# Patient Record
Sex: MALE | Race: WHITE | NOT HISPANIC OR LATINO | URBAN - METROPOLITAN AREA
[De-identification: names, ages, dates, MRNs, and addresses within clinical notes are randomized per-mention and may not be internally consistent; named-entity substitution may affect disease eponyms.]

---

## 2018-05-11 ENCOUNTER — OFFICE VISIT - HEALTHEAST (OUTPATIENT)
Dept: FAMILY MEDICINE | Facility: CLINIC | Age: 38
End: 2018-05-11

## 2018-05-11 ENCOUNTER — COMMUNICATION - HEALTHEAST (OUTPATIENT)
Dept: TELEHEALTH | Facility: CLINIC | Age: 38
End: 2018-05-11

## 2018-05-11 DIAGNOSIS — Z00.00 ROUTINE GENERAL MEDICAL EXAMINATION AT A HEALTH CARE FACILITY: ICD-10-CM

## 2018-05-11 DIAGNOSIS — F41.0 PANIC ATTACKS: ICD-10-CM

## 2018-05-11 LAB
AMPHETAMINES UR QL SCN: NORMAL
BARBITURATES UR QL: NORMAL
BENZODIAZ UR QL: NORMAL
CALCIUM SERPL-MCNC: 10.1 MG/DL (ref 8.5–10.5)
CANNABINOIDS UR QL SCN: NORMAL
CHOLEST SERPL-MCNC: 239 MG/DL
COCAINE UR QL: NORMAL
CREAT UR-MCNC: 86.8 MG/DL
FASTING STATUS PATIENT QL REPORTED: YES
FASTING STATUS PATIENT QL REPORTED: YES
GLUCOSE BLD-MCNC: 94 MG/DL (ref 70–99)
HDLC SERPL-MCNC: 85 MG/DL
LDLC SERPL CALC-MCNC: 140 MG/DL
METHADONE UR QL SCN: NORMAL
OPIATES UR QL SCN: NORMAL
OXYCODONE UR QL: NORMAL
PCP UR QL SCN: NORMAL
TRIGL SERPL-MCNC: 70 MG/DL

## 2018-05-11 ASSESSMENT — MIFFLIN-ST. JEOR: SCORE: 1775.71

## 2018-05-11 NOTE — ASSESSMENT & PLAN NOTE
Not under adequate control.  Continue citalopram.  Add Wellbutrin  mg daily.  We discussed the addictive potential of lorazepam and need to limit this medication.  Will refill 30 tabs once monthly x 2 and then will plan to wean.  Control substance agreement signed. Urine tox screen done. Patient has been difficult to manage despite tryingseveral different medications.  I have recommended evaluation by psychiatry and psychology.  Patient is agreeable.

## 2018-05-30 ENCOUNTER — COMMUNICATION - HEALTHEAST (OUTPATIENT)
Dept: SCHEDULING | Facility: CLINIC | Age: 38
End: 2018-05-30

## 2018-05-30 ENCOUNTER — COMMUNICATION - HEALTHEAST (OUTPATIENT)
Dept: FAMILY MEDICINE | Facility: CLINIC | Age: 38
End: 2018-05-30

## 2018-05-30 ENCOUNTER — OFFICE VISIT - HEALTHEAST (OUTPATIENT)
Dept: FAMILY MEDICINE | Facility: CLINIC | Age: 38
End: 2018-05-30

## 2018-05-30 DIAGNOSIS — R50.9 FEVER: ICD-10-CM

## 2018-05-30 DIAGNOSIS — R05.9 COUGH: ICD-10-CM

## 2018-05-30 DIAGNOSIS — J18.9 LEFT LOWER LOBE PNEUMONIA: ICD-10-CM

## 2018-05-30 DIAGNOSIS — R07.89 CHEST WALL PAIN: ICD-10-CM

## 2018-05-30 DIAGNOSIS — R10.9 LEFT FLANK PAIN: ICD-10-CM

## 2018-05-30 LAB
ALBUMIN UR-MCNC: ABNORMAL MG/DL
APPEARANCE UR: CLEAR
BACTERIA #/AREA URNS HPF: ABNORMAL HPF
BASOPHILS # BLD AUTO: 0.1 THOU/UL (ref 0–0.2)
BASOPHILS NFR BLD AUTO: 1 % (ref 0–2)
BILIRUB UR QL STRIP: NEGATIVE
COLOR UR AUTO: YELLOW
EOSINOPHIL # BLD AUTO: 0.1 THOU/UL (ref 0–0.4)
EOSINOPHIL NFR BLD AUTO: 1 % (ref 0–6)
ERYTHROCYTE [DISTWIDTH] IN BLOOD BY AUTOMATED COUNT: 10.7 % (ref 11–14.5)
FLUAV AG SPEC QL IA: NORMAL
FLUBV AG SPEC QL IA: NORMAL
GLUCOSE UR STRIP-MCNC: NEGATIVE MG/DL
HCT VFR BLD AUTO: 45.3 % (ref 40–54)
HGB BLD-MCNC: 15.3 G/DL (ref 14–18)
HGB UR QL STRIP: NEGATIVE
KETONES UR STRIP-MCNC: ABNORMAL MG/DL
LEUKOCYTE ESTERASE UR QL STRIP: NEGATIVE
LYMPHOCYTES # BLD AUTO: 1.1 THOU/UL (ref 0.8–4.4)
LYMPHOCYTES NFR BLD AUTO: 10 % (ref 20–40)
MCH RBC QN AUTO: 32.3 PG (ref 27–34)
MCHC RBC AUTO-ENTMCNC: 33.8 G/DL (ref 32–36)
MCV RBC AUTO: 96 FL (ref 80–100)
MONOCYTES # BLD AUTO: 1.2 THOU/UL (ref 0–0.9)
MONOCYTES NFR BLD AUTO: 11 % (ref 2–10)
NEUTROPHILS # BLD AUTO: 8.8 THOU/UL (ref 2–7.7)
NEUTROPHILS NFR BLD AUTO: 79 % (ref 50–70)
NITRATE UR QL: NEGATIVE
PH UR STRIP: >=9 [PH] (ref 5–8)
PLATELET # BLD AUTO: 281 THOU/UL (ref 140–440)
PMV BLD AUTO: 6.4 FL (ref 7–10)
RBC # BLD AUTO: 4.73 MILL/UL (ref 4.4–6.2)
RBC #/AREA URNS AUTO: ABNORMAL HPF
SP GR UR STRIP: 1.01 (ref 1–1.03)
SQUAMOUS #/AREA URNS AUTO: ABNORMAL LPF
UROBILINOGEN UR STRIP-ACNC: ABNORMAL
WBC #/AREA URNS AUTO: ABNORMAL HPF
WBC: 11.2 THOU/UL (ref 4–11)

## 2018-05-30 RX ORDER — ALBUTEROL SULFATE 90 UG/1
2 AEROSOL, METERED RESPIRATORY (INHALATION) EVERY 4 HOURS PRN
Qty: 1 EACH | Refills: 0 | Status: SHIPPED | OUTPATIENT
Start: 2018-05-30

## 2018-07-26 ENCOUNTER — COMMUNICATION - HEALTHEAST (OUTPATIENT)
Dept: FAMILY MEDICINE | Facility: CLINIC | Age: 38
End: 2018-07-26

## 2018-07-26 DIAGNOSIS — F41.0 PANIC ATTACKS: ICD-10-CM

## 2018-12-28 ENCOUNTER — COMMUNICATION - HEALTHEAST (OUTPATIENT)
Dept: FAMILY MEDICINE | Facility: CLINIC | Age: 38
End: 2018-12-28

## 2018-12-28 DIAGNOSIS — F41.0 PANIC ATTACKS: ICD-10-CM

## 2019-01-03 ENCOUNTER — OFFICE VISIT - HEALTHEAST (OUTPATIENT)
Dept: FAMILY MEDICINE | Facility: CLINIC | Age: 39
End: 2019-01-03

## 2019-01-03 ENCOUNTER — COMMUNICATION - HEALTHEAST (OUTPATIENT)
Dept: FAMILY MEDICINE | Facility: CLINIC | Age: 39
End: 2019-01-03

## 2019-01-03 DIAGNOSIS — S61.412A LACERATION OF LEFT HAND, FOREIGN BODY PRESENCE UNSPECIFIED, INITIAL ENCOUNTER: ICD-10-CM

## 2019-10-10 ENCOUNTER — COMMUNICATION - HEALTHEAST (OUTPATIENT)
Dept: FAMILY MEDICINE | Facility: CLINIC | Age: 39
End: 2019-10-10

## 2019-10-10 ENCOUNTER — OFFICE VISIT - HEALTHEAST (OUTPATIENT)
Dept: FAMILY MEDICINE | Facility: CLINIC | Age: 39
End: 2019-10-10

## 2019-10-10 DIAGNOSIS — F41.0 PANIC ATTACKS: ICD-10-CM

## 2019-10-10 DIAGNOSIS — Z13.1 SCREENING FOR DIABETES MELLITUS: ICD-10-CM

## 2019-10-10 DIAGNOSIS — Z13.220 SCREENING FOR LIPID DISORDERS: ICD-10-CM

## 2019-10-10 DIAGNOSIS — Z72.0 TOBACCO ABUSE: ICD-10-CM

## 2019-10-10 DIAGNOSIS — E78.5 HYPERLIPIDEMIA, UNSPECIFIED HYPERLIPIDEMIA TYPE: ICD-10-CM

## 2019-10-10 DIAGNOSIS — F41.9 ANXIETY: ICD-10-CM

## 2019-10-10 DIAGNOSIS — L20.82 FLEXURAL ECZEMA: ICD-10-CM

## 2019-10-10 DIAGNOSIS — Z00.00 ROUTINE GENERAL MEDICAL EXAMINATION AT A HEALTH CARE FACILITY: ICD-10-CM

## 2019-10-10 LAB
ALT SERPL W P-5'-P-CCNC: 58 U/L (ref 0–45)
ANION GAP SERPL CALCULATED.3IONS-SCNC: 11 MMOL/L (ref 5–18)
AST SERPL W P-5'-P-CCNC: 43 U/L (ref 0–40)
BUN SERPL-MCNC: 12 MG/DL (ref 8–22)
CALCIUM SERPL-MCNC: 10.5 MG/DL (ref 8.5–10.5)
CHLORIDE BLD-SCNC: 101 MMOL/L (ref 98–107)
CHOLEST SERPL-MCNC: 229 MG/DL
CO2 SERPL-SCNC: 27 MMOL/L (ref 22–31)
CREAT SERPL-MCNC: 0.93 MG/DL (ref 0.7–1.3)
FASTING STATUS PATIENT QL REPORTED: YES
GFR SERPL CREATININE-BSD FRML MDRD: >60 ML/MIN/1.73M2
GLUCOSE BLD-MCNC: 95 MG/DL (ref 70–125)
HDLC SERPL-MCNC: 101 MG/DL
LDLC SERPL CALC-MCNC: 106 MG/DL
POTASSIUM BLD-SCNC: 4.8 MMOL/L (ref 3.5–5)
SODIUM SERPL-SCNC: 139 MMOL/L (ref 136–145)
TRIGL SERPL-MCNC: 110 MG/DL

## 2019-10-10 RX ORDER — LORAZEPAM 0.5 MG/1
TABLET ORAL
Qty: 10 TABLET | Refills: 0 | Status: SHIPPED | OUTPATIENT
Start: 2019-10-10

## 2019-10-10 RX ORDER — DIPHENHYDRAMINE HCL 25 MG
25 CAPSULE ORAL
Status: SHIPPED | COMMUNITY
Start: 2019-10-10

## 2019-10-10 RX ORDER — CLOBETASOL PROPIONATE 0.5 MG/G
CREAM TOPICAL
Qty: 45 G | Refills: 0 | Status: SHIPPED | OUTPATIENT
Start: 2019-10-10

## 2019-10-10 ASSESSMENT — PATIENT HEALTH QUESTIONNAIRE - PHQ9: SUM OF ALL RESPONSES TO PHQ QUESTIONS 1-9: 8

## 2019-10-10 ASSESSMENT — ANXIETY QUESTIONNAIRES
2. NOT BEING ABLE TO STOP OR CONTROL WORRYING: NEARLY EVERY DAY
7. FEELING AFRAID AS IF SOMETHING AWFUL MIGHT HAPPEN: SEVERAL DAYS
GAD7 TOTAL SCORE: 17
6. BECOMING EASILY ANNOYED OR IRRITABLE: MORE THAN HALF THE DAYS
4. TROUBLE RELAXING: NEARLY EVERY DAY
1. FEELING NERVOUS, ANXIOUS, OR ON EDGE: NEARLY EVERY DAY
3. WORRYING TOO MUCH ABOUT DIFFERENT THINGS: NEARLY EVERY DAY
5. BEING SO RESTLESS THAT IT IS HARD TO SIT STILL: MORE THAN HALF THE DAYS

## 2019-10-10 ASSESSMENT — MIFFLIN-ST. JEOR: SCORE: 1721.85

## 2019-10-10 NOTE — ASSESSMENT & PLAN NOTE
Symptoms are intermittent.  Currently, they are quite severe on his left elbow.  Time scaly.  Treatwith high potency steroid.  Clobetasol prescribed.  He understands that he cannot use this medication more than 14 days at a time.

## 2019-10-10 NOTE — ASSESSMENT & PLAN NOTE
Previous history of anxiety with episodes of panic.  In reviewing his history, I suspect that he actually has bipolar disorder or cyclothymia.  He denies suicidal ideation.  He has had side effect such as sexual side effects and sedation with SSRI therapy in the past.  Unclear if he has been on an SNRI.  - Monotherapy with Abilify.  Initial dose will be 2.5 mg/day.  We discussed side effects of this medication.  If ineffective at this dose, we will increase to 5 mg.  - Referral to psychiatry/psychology.   -Check metabolic labs.

## 2019-10-10 NOTE — ASSESSMENT & PLAN NOTE
Patient was tolerant of 2.5 mg, 5 mg.  At this point, not much clinical benefit.  We will increase to 7.5 mg and reassess in a coupleof weeks.  Revised prescription sent to pharmacy.  Will plan for clinic visit in 2-3 weeks.

## 2019-10-30 ENCOUNTER — COMMUNICATION - HEALTHEAST (OUTPATIENT)
Dept: FAMILY MEDICINE | Facility: CLINIC | Age: 39
End: 2019-10-30

## 2019-11-04 RX ORDER — ARIPIPRAZOLE 5 MG/1
7.5 TABLET ORAL DAILY
Qty: 45 TABLET | Refills: 1 | Status: SHIPPED | OUTPATIENT
Start: 2019-11-04

## 2021-05-26 ASSESSMENT — PATIENT HEALTH QUESTIONNAIRE - PHQ9: SUM OF ALL RESPONSES TO PHQ QUESTIONS 1-9: 8

## 2021-05-28 ASSESSMENT — ANXIETY QUESTIONNAIRES: GAD7 TOTAL SCORE: 17

## 2021-06-01 VITALS — WEIGHT: 191 LBS | HEIGHT: 69 IN | BODY MASS INDEX: 28.29 KG/M2

## 2021-06-01 VITALS — BODY MASS INDEX: 27.86 KG/M2 | WEIGHT: 190 LBS

## 2021-06-02 VITALS — WEIGHT: 191.8 LBS | BODY MASS INDEX: 28.12 KG/M2

## 2021-06-02 NOTE — TELEPHONE ENCOUNTER
Per [facility] no appointemnts have been made with them. I did mail a letter to him as well, but have had no response.

## 2021-06-03 VITALS
HEIGHT: 69 IN | HEART RATE: 66 BPM | OXYGEN SATURATION: 98 % | DIASTOLIC BLOOD PRESSURE: 74 MMHG | TEMPERATURE: 98.1 F | BODY MASS INDEX: 26.66 KG/M2 | WEIGHT: 180 LBS | RESPIRATION RATE: 18 BRPM | SYSTOLIC BLOOD PRESSURE: 118 MMHG

## 2021-06-03 NOTE — TELEPHONE ENCOUNTER
Anxiety  Patient was tolerant of 2.5 mg, 5 mg.  At this point, not much clinical benefit.  We will increase to 7.5 mg and reassess in a couple of weeks.  Revised prescription sent to pharmacy.  Will plan for clinic visit in 2-3 weeks.

## 2021-06-16 PROBLEM — Z00.00 ROUTINE GENERAL MEDICAL EXAMINATION AT A HEALTH CARE FACILITY: Status: ACTIVE | Noted: 2019-10-10

## 2021-06-16 PROBLEM — F41.9 ANXIETY: Status: ACTIVE | Noted: 2018-05-11

## 2021-06-16 PROBLEM — L20.82 FLEXURAL ECZEMA: Status: ACTIVE | Noted: 2019-10-10

## 2021-06-16 PROBLEM — E78.5 HYPERLIPIDEMIA: Status: ACTIVE | Noted: 2018-05-22

## 2021-06-16 PROBLEM — Z72.0 TOBACCO ABUSE: Status: ACTIVE | Noted: 2019-10-10

## 2021-06-17 NOTE — PATIENT INSTRUCTIONS - HE
Patient Instructions by Darrell Brewer PA-C at 1/3/2019  8:50 AM     Author: Darrell Brewer PA-C Service: -- Author Type: Physician Assistant    Filed: 1/3/2019 10:17 AM Encounter Date: 1/3/2019 Status: Signed    : Darrell Brewer PA-C (Physician Assistant)       Your wound was closed with sutures and covered with antibiotic ointment and a non-adhesive dressing.    General wound maintenance:  - Dressings should be left in place for 24 hours, afterwards wound may be left open to air  - May continue to cover injury site with a topical antibiotic such as neosporin  - A simple band-aid will work to protect small wounds and keep topical antibiotic on  - Cloth gauze are more useful for covering larger wounds with tape to secure  - May shower normally if it has been longer than 12 hours since sutures were placed    Avoid any swimming until after sutures have been removed.    Avoid a lot of stretching or pulling at the interdigital area of the laceration.  Protect hand from trauma and keep clean dry and covered until suture removed.  Work restrictions with no use of the left hand until seen for suture removal in 10-12 days.    Follow-up in 10-12 days to remove your sutures.    Come back sooner if you develop fever or your wound site becomes increasingly red, tender, producing pus or other drainage, or reopens.      Patient Education     Extremity Laceration: Stitches, Staples, or Tape  A laceration is a cut through the skin. If it is deep, it may require stitches or staples to close so it can heal. Minor cuts may be treated with surgical tape closures, or skin glue.  X-rays may be done if something may have entered the skin through the cut. You may also need a tetanus shot if you are not up to date on this vaccine.  Home care    Follow the healthcare providers instructions on how to care for the cut.    Wash your hands with soap and warm water before and after caring for your wound. This is to help prevent  infection.    Keep the wound clean and dry. If a bandage was applied and it becomes wet or dirty, replace it. Otherwise, leave it in place for the first 24 hours, then change it once a day or as directed.    If stitches or staples were used, clean the wound daily:  ? After removing the bandage, wash the area with soap and water. Use a wet cotton swab to loosen and remove any blood or crust that forms.  ? After cleaning, keep the wound clean and dry. Talk with your healthcare provider before putting any antibiotic ointment on the wound. Reapply the bandage.    You may remove the bandage to shower as usual after the first 24 hours, but don't soak the area in water (no swimming) until the stitches or staples are removed.    If surgical tape closures were used, keep the area clean and dry. If it becomes wet, blot it dry with a towel. Let the surgical tape fall off on its own.    The healthcare provider may prescribe an antibiotic cream or ointment to prevent infection. He or she may also prescribe an antibiotic pill. Don't stop taking this medicine until you have finished it all or the provider tells you to stop.    The provider may also prescribe medicine for pain. Follow the instructions for taking these medicines.    Don't do activities that may reopen your wound.  Follow-up care  Follow up with your healthcare provider, or as advised. Most skin wounds heal within 10 days. But an infection may sometimes occur even with proper treatment. Check the wound daily for the signs of infection listed below. Stitches and staples should be removed within 7 to14 days. If surgical tape closures were used, you may remove them after 10 days if they have not fallen off by then.   When to seek medical advice  Call your healthcare provider right away if any of these occur:    Wound bleeding not controlled by direct pressure    Signs of infection, including increasing pain in the wound, increasing wound redness or swelling, or pus or  bad odor coming from the wound    Fever of 100.4 F (38 C) or higher, or as directed by your healthcare provider    Stitches or staples come apart or fall out or surgical tape falls off before 7 days    Wound edges reopen    Wound changes colors    Numbness occurs around the wound     Decreased movement around the injured area  Date Last Reviewed: 7/1/2017 2000-2017 [employer]. [address] [place] [address]. All rights reserved. This information is not intended as a substitute for professional medical care. Always follow your healthcare professional's instructions.

## 2021-06-17 NOTE — PROGRESS NOTES
MALE PREVENTATIVE EXAM    Assessment and Plan:     Problem List Items Addressed This Visit     Panic attacks     Not under adequate control.  Continue citalopram.  Add Wellbutrin  mg daily.  We discussed the addictive potential of lorazepam and need to limit this medication.  Will refill 30 tabs once monthly x 2 and then will plan to wean.  Control substance agreement signed. Urine tox screen done. Patient has been difficult to manage despite trying several different medications.  I have recommended evaluation by psychiatry and psychology.  Patient is agreeable.         Relevant Medications    LORazepam (ATIVAN) 0.5 MG tablet    buPROPion (WELLBUTRIN XL) 150 MG 24 hr tablet    Other Relevant Orders    Calcium (Completed)    Drug Abuse 1+, Urine (Completed)    Ambulatory referral to Psychiatry    Ambulatory referral to Psychology      Other Visit Diagnoses     Routine general medical examination at a health care facility    -  Primary    Relevant Orders    Lipid [place] FASTING (Completed)    Glucose (Completed)          Patient Instructions   Start Wellbutrin 150 mg daily.  Continue Lexapro 5 mg daily for 1 week.  Then decrease to 2.5 mg daily for 3 weeks, then stop.  You will get calls to schedule with psychiatry and psychology.  Use Lorazepam sparingly.  May refill 30 tabs once monthly for two months, then we will be decreasing.  We will notify you of lab results.  Consider signing up for my chart.     Subjective:   Chief Complaint: Paco Hernandes is an 38 y.o. male here for a preventative health visit.     HPI: The patient is also complaining of anxiety.  He reports he had been having issues with anxiety about 10 years ago.  He started having panic attacks again about a year ago.  He was seen at his provider.  He was treated with ativan which was very helpful with the acute attacks.  He also started on Buspar but had side effects including nausea.  He was switched to Lexapro.  Panic attacks are better  on that but energy levels are down and overall his mood is worse.  He is also having sexual dysfunction on the medication. He has been down to 5 mg daily for about 4 months.  He did try to decrease Lexapro to 2.5 mg daily but had increased panic attacks on that dose.  He hasn't taken Lorazepam for a few months because he ran out of the medication.  He reports he was taking the Lorazepam 2-4 tabs per week, 30 tabs would last two months.  He has seen a counselor in the past but not now.      He has also noticed slow weight gain over time.  He is trying to watch his diet.    Patient Active Problem List   Diagnosis     Panic attacks      Past Medical History:   Diagnosis Date     Major depressive disorder       Past Surgical History:   Procedure Laterality Date     OTHER SURGICAL HISTORY  infant    Repair of pyloric stenosis     RHINOPLASTY      Deviated septum        Current Outpatient Prescriptions:      escitalopram oxalate (LEXAPRO) 10 MG tablet, TAKE 1 TABLET BY MOUTH ONCE DAILY, Disp: , Rfl:      LORazepam (ATIVAN) 0.5 MG tablet, Take 1 tablet (0.5 mg total) by mouth daily as needed for anxiety., Disp: 30 tablet, Rfl: 0      Review of Systems   Constitutional: Negative for activity change, appetite change, fever and unexpected weight change.   HENT: Negative for congestion, hearing loss and sore throat.    Eyes: Negative for discharge and visual disturbance.   Respiratory: Negative for cough, shortness of breath and wheezing.    Cardiovascular: Negative for chest pain, palpitations and leg swelling.   Gastrointestinal: Negative for abdominal pain, blood in stool, constipation, diarrhea and vomiting.   Endocrine: Negative for polydipsia and polyuria.   Genitourinary: Negative for decreased urine volume, difficulty urinating, dysuria, frequency, hematuria and urgency.   Musculoskeletal: Negative for arthralgias, gait problem and myalgias.   Skin: Negative for rash.   Allergic/Immunologic: Negative for  "immunocompromised state.   Neurological: Negative for weakness.   Hematological: Does not bruise/bleed easily.   Psychiatric/Behavioral: Negative for dysphoric mood and sleep disturbance. The patient is not nervous/anxious.          Objective:   Vital Signs:   /88 (Patient Site: Left Arm, Patient Position: Sitting, Cuff Size: Adult Regular)  Pulse 70  Temp 98  F (36.7  C) (Oral)   Resp 14  Ht 5' 9.25\" (1.759 m)  Wt 191 lb (86.6 kg)  SpO2 98%  BMI 28 kg/m2     PHYSICAL EXAM  Physical Exam   Constitutional: He is oriented to person, place, and time. He appears well-developed and well-nourished. No distress.   HENT:   Right Ear: Tympanic membrane and ear canal normal.   Left Ear: Tympanic membrane and ear canal normal.   Mouth/Throat: Oropharynx is clear and moist and mucous membranes are normal.   Eyes: Conjunctivae and EOM are normal. Pupils are equal, round, and reactive to light.   Neck: Normal range of motion. Neck supple. Carotid bruit is not present. No thyromegaly present.   Cardiovascular: Normal rate and regular rhythm.    No murmur heard.  Pulmonary/Chest: Effort normal and breath sounds normal. No respiratory distress. He has no wheezes. He has no rales.   Abdominal: Soft. Bowel sounds are normal. He exhibits no distension and no mass. There is no hepatosplenomegaly. There is no tenderness. Hernia confirmed negative in the ventral area.   Musculoskeletal: He exhibits no edema.   Lymphadenopathy:     He has no cervical adenopathy.   Neurological: He is alert and oriented to person, place, and time. No cranial nerve deficit.   Skin: No rash noted.   Psychiatric: He has a normal mood and affect. Judgment normal.   Vitals reviewed.    I have had an Advance Directives discussion with the patient.  "

## 2021-06-18 NOTE — LETTER
Letter by Darrell Brewer PA-C at      Author: Darrell Brewer PA-C Service: -- Author Type: --    Filed:  Encounter Date: 1/3/2019 Status: (Other)       January 3, 2019     Patient: Paco Hernandes   YOB: 1980   Date of Visit: 1/3/2019       To Whom It May Concern:    It is my medical opinion that Paco Hernandes Off work 1/3/2019.  May return to work on 1/4/2019 with no use of left hand until sutures are removed in 10-12 days.  Keep hand clean dry and protected from further trauma.    If you have any questions or concerns, please don't hesitate to call.    Sincerely,        Electronically signed by Darrell Brewer PA-C

## 2021-06-22 NOTE — TELEPHONE ENCOUNTER
Left hand on webbing laceration.  Still bleeding despite trying to use pressure and bandages.  Not deep or bear tendons patient says so he will try [facility] for stitches.    If it is actively bleeding he would need to go to the ED.    Caller agrees with care advice given. Agreed to call back if he has additional symptoms or questions.    Zainab Da Silva, RN, [facility] Nurse Triage/Med Refills RN     Reason for Disposition    [1] Bleeding AND [2] won't stop after 10 minutes of direct pressure (using correct technique)    Protocols used: CUTS AND [id number]

## 2021-06-26 ENCOUNTER — HEALTH MAINTENANCE LETTER (OUTPATIENT)
Age: 41
End: 2021-06-26

## 2021-06-26 NOTE — PROGRESS NOTES
Progress Notes by Hiro Sim DO at 5/30/2018  5:10 PM     Author: Hiro Sim DO Service: -- Author Type: Physician    Filed: 5/31/2018  9:14 AM Encounter Date: 5/30/2018 Status: Signed    : Hiro Sim DO (Physician)       Chief Complaint   Patient presents with   ? Chest Pain     started this morning 5/30 8 week   ? Fever     5/30   ? Cough     5/30     History of Present Illness: Rooming staff notes reviewed.  Patient presents to clinic, stating that he started to feel ill since earlier today in the morning.  His symptoms included a cough, and severe discomfort in his left upper flank region of back.  He has also developed a high fever during the day.  He does not complain of substernal chest discomfort.  He does not feel shortness of breath, but does have trouble taking a deep inspiration because of left flank discomfort.    Review of systems: See history of present illness, otherwise negative.     Current Outpatient Prescriptions   Medication Sig Dispense Refill   ? buPROPion (WELLBUTRIN XL) 150 MG 24 hr tablet Take 1 tablet (150 mg total) by mouth every morning. 30 tablet 11   ? albuterol (PROAIR HFA;PROVENTIL HFA;VENTOLIN HFA) 90 mcg/actuation inhaler Inhale 2 puffs every 4 (four) hours as needed for wheezing. 1 each 0   ? azithromycin (ZITHROMAX) 250 MG tablet Take 500 mg (2 x 250 mg tablets) on day 1 followed by 250 mg (1 tablet) on days 2-5. 6 tablet 0   ? escitalopram oxalate (LEXAPRO) 10 MG tablet TAKE 1 TABLET BY MOUTH ONCE DAILY     ? LORazepam (ATIVAN) 0.5 MG tablet Take 1 tablet (0.5 mg total) by mouth daily as needed for anxiety. 30 tablet 0     Family medical history: Reviewed, is not pertinent to concerns and exam findings on day of exam.    Past Medical History:   Diagnosis Date   ? Major depressive disorder       Past Surgical History:   Procedure Laterality Date   ? OTHER SURGICAL HISTORY  infant    Repair of pyloric stenosis   ? RHINOPLASTY      Deviated septum      Social  History     Social History   ? Marital status:      Spouse name: N/A   ? Number of children: N/A   ? Years of education: N/A     Social History Main Topics   ? Smoking status: Current Some Day Smoker     Packs/day: 0.50     Types: Cigarettes   ? Smokeless tobacco: Never Used   ? Alcohol use 3.6 oz/week     6 Standard drinks or equivalent per week   ? Drug use: No   ? Sexual activity: Yes     Partners: Female     Other Topics Concern   ? Not on file     Social History Narrative   ? No narrative on file       History   Smoking Status   ? Current Some Day Smoker   ? Packs/day: 0.50   ? Types: Cigarettes   Smokeless Tobacco   ? Never Used      Exam:   Blood pressure 120/82, pulse (!) 118, temperature (!) 100.9  F (38.3  C), temperature source Oral, resp. rate 20, weight 190 lb (86.2 kg), SpO2 96 %.    EXAM: initial heart rate noted to be increased at 151 bpm, and fever noted to be 102.8  General: Vital signs reviewed.  Patient had a normal SPO2 throughout exam.  The tachycardia improved significantly after treatment with ketorolac and albuterol, though remains above 100 bpm.  Patient is in some distress due to severe left flank pain, and a high fever.  He was actually crying at times on initial presentation.  Breathing is non labored appearing. Patient is alert and oriented x 3.   ENT: Tympanic membranes are clear and without injection bilaterally, nasal turbinates show no injection or rhinorrhea, no pharyngeal injection or exudate.  Eyes: No scleral injection or other discoloration.  Heart: Tachycardia noted, with the rate being regular without murmur.  Lungs: Initial lung exam showed lungs to be clear to auscultation with fair air flow bilaterally.  Patient was guarding with inspiration due to left lower/posterior chest wall discomfort with deeper inspiration.  After the nebulizer treatment, patient stated he could take a deeper inspiration with less discomfort.  With the initial deep inspiration, I heard some  coarse crackles in the left lung base, which dissipated after several deep breaths.  Skin: Diaphoretic and febrile.  His skin did not have any rash in his area of left flank discomfort.  Neuro: No focal deficits.  After treatment with ketorolac and albuterol, patient appeared to be much more comfortable, and stated he felt significantly better.    Recent Results (from the past 24 hour(s))   Influenza A/B Rapid Test   Result Value Ref Range    Influenza  A, Rapid Antigen No Influenza A antigen detected No Influenza A antigen detected    Influenza B, Rapid Antigen No Influenza B antigen detected No Influenza B antigen detected   HM1 (CBC with Diff)   Result Value Ref Range    WBC 11.2 (H) 4.0 - 11.0 thou/uL    RBC 4.73 4.40 - 6.20 mill/uL    Hemoglobin 15.3 14.0 - 18.0 g/dL    Hematocrit 45.3 40.0 - 54.0 %    MCV 96 80 - 100 fL    MCH 32.3 27.0 - 34.0 pg    MCHC 33.8 32.0 - 36.0 g/dL    RDW 10.7 (L) 11.0 - 14.5 %    Platelets 281 140 - 440 thou/uL    MPV 6.4 (L) 7.0 - 10.0 fL    Neutrophils % 79 (H) 50 - 70 %    Lymphocytes % 10 (L) 20 - 40 %    Monocytes % 11 (H) 2 - 10 %    Eosinophils % 1 0 - 6 %    Basophils % 1 0 - 2 %    Neutrophils Absolute 8.8 (H) 2.0 - 7.7 thou/uL    Lymphocytes Absolute 1.1 0.8 - 4.4 thou/uL    Monocytes Absolute 1.2 (H) 0.0 - 0.9 thou/uL    Eosinophils Absolute 0.1 0.0 - 0.4 thou/uL    Basophils Absolute 0.1 0.0 - 0.2 thou/uL   Urinalysis   Result Value Ref Range    Color, UA Yellow Colorless, Yellow, Straw, Light Yellow    Clarity, UA Clear Clear    Glucose, UA Negative Negative    Bilirubin, UA Negative Negative    Ketones, UA 15 mg/dL (!) Negative    Specific Gravity, UA 1.015 1.005 - 1.030    Blood, UA Negative Negative    pH, UA >=9.0 (H) 5.0 - 8.0    Protein, UA 30 mg/dL (!) Negative mg/dL    Urobilinogen, UA 0.2 E.U./dL 0.2 E.U./dL, 1.0 E.U./dL    Nitrite, UA Negative Negative    Leukocytes, UA Negative Negative    Bacteria, UA None Seen None Seen hpf    RBC, UA 0-2 None Seen, 0-2  hpf    WBC, UA 0-5 None Seen, 0-5 hpf    Squam Epithel, UA None Seen None Seen, 0-5 lpf   Available results from exam reviewed with patient.  The CBC showed a mildly overall with increased bacteria being present.  The urinalysis showed some evidence of dehydration, with no evidence of an infection.  I reviewed the chest x-ray study with patient at time of exam.  I felt there was no evidence of an infiltrate along the posterior lung base of 1 of the lungs on the lateral view.  I reviewed the radiology report with patient at exam also, with no abnormality noted by the radiologist.  I recommended to patient that we treat him for left lower lobe pneumonia, which he was agreeable to.    Assessment/Plan   1. Fever  ketorolac injection 60 mg (TORADOL)    Influenza A/B Rapid Test    HM1(CBC and Differential)    HM1 (CBC with Diff)    XR Chest 2 Views   2. Chest wall pain  ketorolac injection 60 mg (TORADOL)   3. Cough  albuterol nebulizer solution 3 mL (PROVENTIL)    HM1(CBC and Differential)    HM1 (CBC with Diff)    XR Chest 2 Views    CANCELED: XR Chest 2 Views   4. Left flank pain  Urinalysis   5. Left lower lobe pneumonia  azithromycin (ZITHROMAX) 250 MG tablet       Patient Instructions   I think you have left lower lobe pneumonia causing a fever, no cough, and left lower chest wall pain.  Start antibiotic treatment this evening.  Use your albuterol inhaler as needed for relief of cough, or shortness of breath every 4 hours.  You can use over-the-counter ibuprofen for continued relief of chest wall pain started 6 hours after your treatment given here in the clinic. Be seen again in 2 days if symptoms are not better, sooner if feeling any worse.  Pneumonia (Adult)  Pneumonia is an infection deep within the lungs. It is in the small air sacs (alveoli). Pneumonia may be caused by a virus or bacteria. Pneumonia caused by bacteria is usually treated with an antibiotic. Severe cases may need to be treated in the hospital.  Milder cases can be treated at home. Symptoms usually start to get better during the first 2 days of treatment.    Home care  Follow these guidelines when caring for yourself at home:    Rest at home for the first 2 to 3 days, or until you feel stronger. Dont let yourself get overly tired when you go back to your activities.    Stay away from cigarette smoke - yours or other peoples.    You may use acetaminophen or ibuprofen to control fever or pain, unless another medicine was prescribed. If you have chronic liver or kidney disease, talk with your healthcare provider before using these medicines. Also talk with your provider if youve had a stomach ulcer or gastrointestinal bleeding. Dont give aspirin to anyone younger than 18 years of age who is ill with a fever. It may cause severe liver damage.    Your appetite may be poor, so a light diet is fine.    Drink 6 to 8 glasses of fluids every day to make sure you are getting enough fluids. Beverages can include water, sport drinks, sodas without caffeine, juices, tea, or soup. Fluids will help loosen secretions in the lung. This will make it easier for you to cough up the phlegm (sputum). If you also have heart or kidney disease, check with your healthcare provider before you drink extra fluids.    Take antibiotic medicine prescribed until it is all gone, even if you are feeling better after a few days.  Follow-up care  Follow up with your healthcare provider in the next 2 to 3 days, or as advised. This is to be sure the medicine is helping you get better.  If you are 65 or older, you should get a pneumococcal vaccine and a yearly flu (influenza) shot. You should also get these vaccines if you have chronic lung disease like asthma, emphysema, or COPD. Recently, a second type of pneumonia vaccine has become available for everyone over 65 years old. This is in addition to the previous vaccine. Ask your provider about this.  When to seek medical advice  Call your  healthcare provider right away if any of these occur:    You dont get better within the first 48 hours of treatment    Shortness of breath gets worse    Rapid breathing (more than 25 breaths per minute)    Coughing up blood    Chest pain gets worse with breathing    Fever of 100.4 F (38 C) or higher that doesnt get better with fever medicine    Weakness, dizziness, or fainting that gets worse    Thirst or dry mouth that gets worse    Sinus pain, headache, or a stiff neck    Chest pain not caused by coughing  Date Last Reviewed: 1/1/2017 2000-2017 [employer]. [address] [place] [address]. All rights reserved. This information is not intended as a substitute for professional medical care. Always follow your healthcare professional's instructions.           Hiro Sim,

## 2021-06-27 NOTE — PROGRESS NOTES
Progress Notes by Darrell Brewer PA-C at 1/3/2019  8:50 AM     Author: Darrell Brewer PA-C Service: -- Author Type: Physician Assistant    Filed: 1/3/2019 12:46 PM Encounter Date: 1/3/2019 Status: Signed    : Darrell Brewer PA-C (Physician Assistant)     Procedure Orders    1. Laceration repair [[id number]] ordered by Darrell Brewer PA-C           Post-procedure Diagnoses    1. Laceration of left hand, foreign body presence unspecified, initial encounter [S61.412A]             Subjective:      Patient ID: Paco Hernandes is a 38 y.o. male.    Chief Complaint:    HPI  Paco Hernandes is a 38 y.o. male who presents today complaining of concern for laceration of the interdigital webspace between the ring and long finger of the left hand.  She recounts past medical history for cutting a Kiwi on his counter this morning when he dropped the knife and then reached to try to grab it as it was falling and it lacerated the webspace.  Patient has no neurovascular deficits he is got full range of motion is able make a good close tight fist.  She is requesting treatment in the office today.    Tdap is up-to-date on 11/25/2015.    Past Medical History:   Diagnosis Date   ? Major depressive disorder        Past Surgical History:   Procedure Laterality Date   ? OTHER SURGICAL HISTORY  infant    Repair of pyloric stenosis   ? RHINOPLASTY      Deviated septum       Family History   Problem Relation Age of Onset   ? Other Mother         Chronic pain syndrome   ? Glaucoma Mother    ? Arthritis Father    ? No Medical Problems Sister    ? No Medical Problems Brother        Social History     Tobacco Use   ? Smoking status: Current Some Day Smoker     Packs/day: 0.50     Types: Cigarettes   ? Smokeless tobacco: Never Used   Substance Use Topics   ? Alcohol use: Yes     Alcohol/week: 3.6 oz     Types: 6 Standard drinks or equivalent per week   ? Drug use: No       Review of Systems  As above in HPI, otherwise balance of Review of  Systems are negative.  No history of bleeding disorders. NO nerve deficit.  Objective:     /72 (Patient Site: Right Arm, Patient Position: Sitting, Cuff Size: Adult Regular)   Pulse 78   Temp 99  F (37.2  C) (Oral)   Resp 20   Wt 191 lb 12.8 oz (87 kg)   SpO2 98%   BMI 28.12 kg/m      Physical Exam  General: Patient is resting comfortably no acute distress is afebrile  HEENT: Head is normocephalic atraumatic   Musculoskeletal: Examination of left hand shows patient is able make a good close tight fist.  He sensory intact light touch with proximal being equal to distal and radial equal to ulnar especially on the ring and long fingers where the injury is.  The laceration is 1 cm in size.  It does not extend onto the palmar dorsal aspect of the hand.    Assessment:     Laceration repair  Date/Time: 1/3/2019 9:39 AM  Performed by: Darrell Brewer PA-C  Authorized by: Darrell Brewer PA-C   Body area: upper extremity  Location details: left hand  Laceration length: 1 cm  Foreign bodies: no foreign bodies  Tendon involvement: none  Nerve involvement: none  Vascular damage: no  Anesthesia: local infiltration    Anesthesia:  Local Anesthetic: lidocaine 1% with epinephrine  Anesthetic total: 3 mL    Sedation:  Patient sedated: no    Preparation: Patient was prepped and draped in the usual sterile fashion.  Irrigation solution: saline  Amount of cleaning: standard  Debridement: none  Degree of undermining: none  Skin closure: 4-0 nylon  Number of sutures: 4  Technique: simple  Approximation: close  Approximation difficulty: simple  Dressing: Usual dressing with bacitracin, Adaptic 4 x 4 and adhesive or Band-Aid.  Patient tolerance: Patient tolerated the procedure well with no immediate complications          The encounter diagnosis was Laceration of left hand, foreign body presence unspecified, initial encounter.    Plan:     1. Laceration of left hand, foreign body presence unspecified, initial encounter  lidocaine  1%-EPINEPHrine 1:100,000 1 %-1:100,000 injection 3 mL (XYLOCAINE W/EPI)    Laceration repair         Patient Instructions   Your wound was closed with sutures and covered with antibiotic ointment and a non-adhesive dressing.    General wound maintenance:  - Dressings should be left in place for 24 hours, afterwards wound may be left open to air  - May continue to cover injury site with a topical antibiotic such as neosporin  - A simple band-aid will work to protect small wounds and keep topical antibiotic on  - Cloth gauze are more useful for covering larger wounds with tape to secure  - May shower normally if it has been longer than 12 hours since sutures were placed    Avoid any swimming until after sutures have been removed.    Avoid a lot of stretching or pulling at the interdigital area of the laceration.  Protect hand from trauma and keep clean dry and covered until suture removed.  Work restrictions with no use of the left hand until seen for suture removal in 10-12 days.    Follow-up in 10-12 days to remove your sutures.    Come back sooner if you develop fever or your wound site becomes increasingly red, tender, producing pus or other drainage, or reopens.      Patient Education     Extremity Laceration: Stitches, Staples, or Tape  A laceration is a cut through the skin. If it is deep, it may require stitches or staples to close so it can heal. Minor cuts may be treated with surgical tape closures, or skin glue.  X-rays may be done if something may have entered the skin through the cut. You may also need a tetanus shot if you are not up to date on this vaccine.  Home care    Follow the healthcare providers instructions on how to care for the cut.    Wash your hands with soap and warm water before and after caring for your wound. This is to help prevent infection.    Keep the wound clean and dry. If a bandage was applied and it becomes wet or dirty, replace it. Otherwise, leave it in place for the first 24  hours, then change it once a day or as directed.    If stitches or staples were used, clean the wound daily:  ? After removing the bandage, wash the area with soap and water. Use a wet cotton swab to loosen and remove any blood or crust that forms.  ? After cleaning, keep the wound clean and dry. Talk with your healthcare provider before putting any antibiotic ointment on the wound. Reapply the bandage.    You may remove the bandage to shower as usual after the first 24 hours, but don't soak the area in water (no swimming) until the stitches or staples are removed.    If surgical tape closures were used, keep the area clean and dry. If it becomes wet, blot it dry with a towel. Let the surgical tape fall off on its own.    The healthcare provider may prescribe an antibiotic cream or ointment to prevent infection. He or she may also prescribe an antibiotic pill. Don't stop taking this medicine until you have finished it all or the provider tells you to stop.    The provider may also prescribe medicine for pain. Follow the instructions for taking these medicines.    Don't do activities that may reopen your wound.  Follow-up care  Follow up with your healthcare provider, or as advised. Most skin wounds heal within 10 days. But an infection may sometimes occur even with proper treatment. Check the wound daily for the signs of infection listed below. Stitches and staples should be removed within 7 to14 days. If surgical tape closures were used, you may remove them after 10 days if they have not fallen off by then.   When to seek medical advice  Call your healthcare provider right away if any of these occur:    Wound bleeding not controlled by direct pressure    Signs of infection, including increasing pain in the wound, increasing wound redness or swelling, or pus or bad odor coming from the wound    Fever of 100.4 F (38 C) or higher, or as directed by your healthcare provider    Stitches or staples come apart or fall out  or surgical tape falls off before 7 days    Wound edges reopen    Wound changes colors    Numbness occurs around the wound     Decreased movement around the injured area  Date Last Reviewed: 7/1/2017 2000-2017 [employer]. [address] [place] [address], [place] [address]. All rights reserved. This information is not intended as a substitute for professional medical care. Always follow your healthcare professional's instructions.

## 2021-06-28 NOTE — PROGRESS NOTES
Progress Notes by Ben Lima MD at 10/10/2019  9:00 AM     Author: Ben Lima MD Service: -- Author Type: Physician    Filed: 10/10/2019 12:54 PM Encounter Date: 10/10/2019 Status: Signed    : Ben Lima MD (Physician)       MALE PREVENTATIVE EXAM    Assessment and Plan:     Problem List Items Addressed This Visit     Anxiety     Previous history of anxiety with episodes of panic.  In reviewing his history, I suspect that he actually has bipolar disorder or cyclothymia.  He denies suicidal ideation.  He has had side effect such as sexual side effects and sedation with SSRI therapy in the past.  Unclear if he has been on an SNRI.  - Monotherapy with Abilify.  Initial dose will be 2.5 mg/day.  We discussed side effects of this medication.  If ineffective at this dose, we will increase to 5 mg.  - Referral to psychiatry/psychology.   -Check metabolic labs.         Relevant Medications    ARIPiprazole (ABILIFY) 5 MG tablet    LORazepam (ATIVAN) 0.5 MG tablet    Other Relevant Orders    Basic Metabolic Panel    Ambulatory referral to Psychology    Ambulatory referral to Psychiatry    Hyperlipidemia    Relevant Orders    ALT (SGPT)    Basic Metabolic Panel    AST (SGOT)    Flexural eczema     Symptoms are intermittent.  Currently, they are quite severe on his left elbow.  Time scaly.  Treat with high potency steroid.  Clobetasol prescribed.  He understands that he cannot use this medication more than 14 days at a time.         Relevant Medications    clobetasol-emollient 0.05 % Crea    Tobacco abuse    Routine general medical examination at a health care facility - Primary     This patient presents with mental health concerns today.  This is discussed elsewhere.  He is up-to-date with preventative health recommendations with the exception of HIV screening.  He believes that he has been tested for HIV in the past but does not recall where.  This was estimated to be approximately 10 years ago.   "He is declining rescreened today.  Check fasting lipids.  Screen for diabetes.         Relevant Orders    ALT (SGPT)    Basic Metabolic Panel    Lipid Cascade    AST (SGOT)      Other Visit Diagnoses     Screening for diabetes mellitus        Relevant Orders    Basic Metabolic Panel    Panic attacks        Relevant Medications    ARIPiprazole (ABILIFY) 5 MG tablet    LORazepam (ATIVAN) 0.5 MG tablet    Other Relevant Orders    ALT (SGPT)    Basic Metabolic Panel    Ambulatory referral to Psychology    Ambulatory referral to Psychiatry    Screening for lipid disorders            Next follow up:  Return in about 4 weeks (around 11/7/2019) for Anxiety.    Immunization Review  Adult Imm Review: Missing doses of flu shot   Pt is interested in quitting smoking     I discussed the following with the patient:   Adult Healthy Living: Importance of regular exercise  Healthy nutrition  Getting adequate sleep  Stress management  Herbal medications/alternative medical therapies    I have had an Advance Directives discussion with the patient.    Subjective:   Chief Complaint: Paco Hernandes is an 39 y.o. male here for a preventative health visit.     HPI:      Mental health concerns: Previously, this patient has been treated for anxiety and symptoms of panic.  Today, he says that he has developed symptoms of paranoia.  He does not experience this every day.  This is accompanied by a sense of not wanting to go outside of his house.  He says that at times he will have elevated/escalated mood.  When he has these episodes he likes it.  These last summer between 2-3 days.  He does not have the previous diagnosis of bipolar disorder.  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  Previously, he is been treated for generalized anxiety disorder with citalopram, escitalopram, fluoxetine, BuSpar.  He believes that " "bupropion caused him to feel agitated/energized and uncomfortable way.  \"To care to do stuff.\"  He enjoys playing the guitar.    Sleep: racing thoughts.  Poor sleep.  Benadryl helps.     Healthy Habits  Are you taking a daily aspirin? No  Do you typically exercising at least 40 min, 3-4 times per week?  Yes  Do you usually eat at least 4 servings of fruit and vegetables a day, include whole grains and fiber and avoid regularly eating high fat foods? NO  Have you had an eye exam in the past two years? Yes  Do you see a dentist twice per year? NO  Do you have any concerns regarding sleep? YES    Safety Screen  If you own firearms, are they secured in a locked gun cabinet or with trigger locks? The patient does not own any firearms  Do you feel you are safe where you are living?: Yes (1/3/2019  9:10 AM)    Review of Systems:  Please see above.  The rest of the review of systems are negative for all systems.     Cancer Screening     Patient has no health maintenance due at this time        Patient Care Team:  Niurka Stapleton MD as PCP - General (Family Medicine)  Niurka Stapleton MD as Assigned PCP        History     Reviewed By Date/Time Sections Reviewed    Ben Lima MD 10/10/2019  9:46 AM Tobacco, Alcohol, Drug Use, Sexual Activity    Ben Lima MD 10/10/2019  9:45 AM Medical    Ben Lima MD 10/10/2019  9:33 AM Medical    Alysha Mcneal LPN 10/10/2019  9:02 AM Surgical, Family    Alysha Mcneal LPN 10/10/2019  9:01 AM Tobacco, Alcohol, Drug Use, Sexual Activity    Alysha Mcneal LPN 10/10/2019  8:59 AM Tobacco            Objective:   Vital Signs:   Visit Vitals  /74 (Patient Site: Left Arm, Patient Position: Sitting, Cuff Size: Adult Large)   Pulse 66   Temp 98.1  F (36.7  C) (Oral)   Resp 18   Ht 5' 9\" (1.753 m) Comment: with shoes   Wt 180 lb (81.6 kg)   SpO2 98% Comment: room air   BMI 26.58 kg/m           PHYSICAL EXAM  Physical Exam  Vitals signs " reviewed.   Constitutional:       General: He is not in acute distress.     Appearance: He is well-developed.   HENT:      Head: Normocephalic and atraumatic.      Right Ear: External ear normal.      Left Ear: External ear normal.      Nose: Nose normal.      Mouth/Throat:      Pharynx: No oropharyngeal exudate.   Eyes:      General: No scleral icterus.        Right eye: No discharge.         Left eye: No discharge.      Conjunctiva/sclera: Conjunctivae normal.      Pupils: Pupils are equal, round, and reactive to light.   Neck:      Musculoskeletal: Normal range of motion.      Thyroid: No thyromegaly.   Cardiovascular:      Rate and Rhythm: Normal rate and regular rhythm.      Heart sounds: Normal heart sounds. No murmur. No friction rub. No gallop.    Pulmonary:      Effort: Pulmonary effort is normal. No respiratory distress.      Breath sounds: Normal breath sounds. No wheezing.   Abdominal:      General: There is no distension.      Palpations: Abdomen is soft. There is no mass.      Tenderness: There is no tenderness.   Musculoskeletal: Normal range of motion.   Lymphadenopathy:      Cervical: No cervical adenopathy.   Skin:     General: Skin is warm.   Neurological:      Mental Status: He is alert and oriented to person, place, and time.      Cranial Nerves: No cranial nerve deficit.      Motor: No abnormal muscle tone.      Deep Tendon Reflexes: Reflexes are normal and symmetric.   Psychiatric:         Attention and Perception: Attention normal.         Mood and Affect: Mood is anxious.         Speech: Speech normal. Speech is not rapid and pressured or slurred.         Behavior: Behavior normal.         Thought Content: Thought content normal.         Judgement: Judgment normal.          Medication List          Accurate as of October 10, 2019 12:54 PM. If you have any questions, ask your nurse or doctor.            START taking these medications    ARIPiprazole 5 MG tablet  Also known as:   ABILIFY  INSTRUCTIONS:  Take 0.5 tablets (2.5 mg total) by mouth daily.  Started by:  Ben Lima MD        clobetasol-emollient 0.05 % Crea  INSTRUCTIONS:  Apply to affected area (left elbow) twice daily.  Do not use for more than 14 days.  Started by:  Ben Lima MD           CONTINUE taking these medications    albuterol 90 mcg/actuation inhaler  Also known as:  PROAIR HFA;PROVENTIL HFA;VENTOLIN HFA  INSTRUCTIONS:  Inhale 2 puffs every 4 (four) hours as needed for wheezing.  Doctor's comments:  May substitute the equivalent medication per insurance preference.        diphenhydrAMINE 25 mg capsule  Also known as:  BENADRYL  INSTRUCTIONS:  Take 25 mg by mouth at bedtime as needed for itching.        LORazepam 0.5 MG tablet  Also known as:  ATIVAN  INSTRUCTIONS:  TAKE ONE TABLET BY MOUTH EVERY DAY AS NEEDED FOR ANXIETY           STOP taking these medications    buPROPion 150 MG 24 hr tablet  Also known as:  WELLBUTRIN XL  Stopped by:  Ben Lima MD     escitalopram oxalate 10 MG tablet  Also known as:  LEXAPRO  Stopped by:  Ben Lima MD           Where to Get Your Medications      These medications were sent to [facility] [place] [address]  [address] [place] [address]    Phone:  [phone]     ARIPiprazole 5 MG tablet    clobetasol-emollient 0.05 % Crea    LORazepam 0.5 MG tablet         Additional Screenings Completed Today:

## 2021-06-29 ENCOUNTER — AMBULATORY - HEALTHEAST (OUTPATIENT)
Dept: LAB | Facility: CLINIC | Age: 41
End: 2021-06-29

## 2021-06-29 DIAGNOSIS — Z00.00 ROUTINE GENERAL MEDICAL EXAMINATION AT A HEALTH CARE FACILITY: ICD-10-CM

## 2021-06-29 DIAGNOSIS — K52.9 CHRONIC DIARRHEA: ICD-10-CM

## 2021-06-29 LAB
ALBUMIN SERPL-MCNC: 4.3 G/DL (ref 3.5–5)
ALP SERPL-CCNC: 74 U/L (ref 45–120)
ALT SERPL W P-5'-P-CCNC: 94 U/L (ref 0–45)
ANION GAP SERPL CALCULATED.3IONS-SCNC: 12 MMOL/L (ref 5–18)
AST SERPL W P-5'-P-CCNC: 100 U/L (ref 0–40)
BILIRUB SERPL-MCNC: 1.3 MG/DL (ref 0–1)
BUN SERPL-MCNC: 11 MG/DL (ref 8–22)
CALCIUM SERPL-MCNC: 9.2 MG/DL (ref 8.5–10.5)
CHLORIDE BLD-SCNC: 103 MMOL/L (ref 98–107)
CHOLEST SERPL-MCNC: 221 MG/DL
CO2 SERPL-SCNC: 23 MMOL/L (ref 22–31)
CREAT SERPL-MCNC: 0.9 MG/DL (ref 0.7–1.3)
ERYTHROCYTE [DISTWIDTH] IN BLOOD BY AUTOMATED COUNT: 12.2 % (ref 11–14.5)
FASTING STATUS PATIENT QL REPORTED: YES
GFR SERPL CREATININE-BSD FRML MDRD: >60 ML/MIN/1.73M2
GLUCOSE BLD-MCNC: 95 MG/DL (ref 70–125)
HCT VFR BLD AUTO: 45.1 % (ref 40–54)
HDLC SERPL-MCNC: 85 MG/DL
HGB BLD-MCNC: 15.8 G/DL (ref 14–18)
LDLC SERPL CALC-MCNC: 113 MG/DL
MCH RBC QN AUTO: 31.9 PG (ref 27–34)
MCHC RBC AUTO-ENTMCNC: 35 G/DL (ref 32–36)
MCV RBC AUTO: 91 FL (ref 80–100)
PLATELET # BLD AUTO: 340 THOU/UL (ref 140–440)
PMV BLD AUTO: 8.8 FL (ref 7–10)
POTASSIUM BLD-SCNC: 4.4 MMOL/L (ref 3.5–5)
PROT SERPL-MCNC: 7.5 G/DL (ref 6–8)
RBC # BLD AUTO: 4.95 MILL/UL (ref 4.4–6.2)
SODIUM SERPL-SCNC: 138 MMOL/L (ref 136–145)
TRIGL SERPL-MCNC: 114 MG/DL
WBC: 5.7 THOU/UL (ref 4–11)

## 2021-07-03 NOTE — ADDENDUM NOTE
Addendum Note by Ben Escoto MD at 11/4/2019  6:38 AM     Author: Ben Escoto MD Service: -- Author Type: Physician    Filed: 11/4/2019  6:38 AM Encounter Date: 10/10/2019 Status: Signed    : Ben Escoto MD (Physician)    Addended by: BEN ESCOTO on: 11/4/2019 06:38 AM        Modules accepted: Orders

## 2021-07-05 PROBLEM — Z72.0 TOBACCO USE: Status: ACTIVE | Noted: 2019-10-10

## 2021-07-05 PROBLEM — K52.9 CHRONIC DIARRHEA: Status: ACTIVE | Noted: 2021-06-22

## 2021-07-05 PROBLEM — R74.8 ELEVATED LIVER ENZYMES: Status: ACTIVE | Noted: 2021-07-02

## 2021-07-05 PROBLEM — L30.9 ECZEMA: Status: ACTIVE | Noted: 2019-10-10

## 2021-07-05 PROBLEM — R06.2 WHEEZING: Status: ACTIVE | Noted: 2021-06-22

## 2021-10-16 ENCOUNTER — HEALTH MAINTENANCE LETTER (OUTPATIENT)
Age: 41
End: 2021-10-16

## 2022-02-17 PROBLEM — F10.20 ALCOHOL DEPENDENCE, UNCOMPLICATED (H): Status: ACTIVE | Noted: 2021-06-22

## 2022-07-23 ENCOUNTER — HEALTH MAINTENANCE LETTER (OUTPATIENT)
Age: 42
End: 2022-07-23

## 2022-10-01 ENCOUNTER — HEALTH MAINTENANCE LETTER (OUTPATIENT)
Age: 42
End: 2022-10-01

## 2023-08-06 ENCOUNTER — HEALTH MAINTENANCE LETTER (OUTPATIENT)
Age: 43
End: 2023-08-06